# Patient Record
(demographics unavailable — no encounter records)

---

## 2025-07-06 NOTE — PHYSICAL EXAM
[Chaperoned Physical Exam] : A chaperone was present in the examining room during all aspects of the physical examination. [MA] : MA [FreeTextEntry2] : Nikolai [Appropriately responsive] : appropriately responsive [Alert] : alert [No Acute Distress] : no acute distress [No Lymphadenopathy] : no lymphadenopathy [Soft] : soft [Non-tender] : non-tender [Non-distended] : non-distended [No HSM] : No HSM [No Lesions] : no lesions [No Mass] : no mass [Oriented x3] : oriented x3 [Examination Of The Breasts] : a normal appearance [No Masses] : no breast masses were palpable [Labia Majora] : normal [Labia Minora] : normal [Normal] : normal [Uterine Adnexae] : normal

## 2025-07-06 NOTE — PLAN
[FreeTextEntry1] :  Urine pregnancy test ordered due to slightly delayed menstrual cycle- neg in office  - Recommend follow-up in 1-2 years for next routine examination  - Patient education provided on the importance of regular gynecological check-ups  - Prediabetes Risk : Patient has a history of borderline elevated blood glucose. Recent fasting glucose of 102 mg/dL suggests continued risk for prediabetes, despite being interpreted as not pre-diabetic by her general practitioner. - Advise patient to continue monitoring blood glucose levels Recommend lifestyle modifications including diet and exercise  - Suggest follow-up with her general practitioner for further diabetes screening, including HbA1c test  - Patient education on signs and symptoms of diabetes and the importance of regular screening  - Anemia Concern : Patient reports a history of low hemoglobin levels that subsequently increased, requiring further testing. - Review recent hemoglobin test results if available  - Consider ordering a complete blood count (CBC) if not recently done  - Advise follow-up with general practitioner for monitoring of hemoglobin levels  - Patient education on signs and symptoms of anemia and importance of iron-rich diet  - Family Planning : Patient is sexually active and currently using condoms for contraception. - Discuss satisfaction with current contraceptive method Patient reports good mood with a PHQ-9 score of 1, indicating minimal depression. However, she is managing stress related to her child with autism. - Continue to monitor mental health at future visits 
Additional Progress Note...

## 2025-07-06 NOTE — HISTORY OF PRESENT ILLNESS
[FreeTextEntry1] : Yvrose Rodriguez, a mother of three, presents for a routine gynecological examination after a three-year interval. She initially expressed a desire to skip the Pap smear but agreed to proceed with the examination. The patient reports no specific gynecological complaints. She mentions that her menstrual cycles are regular, occurring monthly, although the duration has decreased from 7 days to 3 days. The patient also discusses her recent diabetes screening, reporting a fasting blood glucose of 102 mg/dL, which was interpreted as not pre-diabetic by her general practitioner. She expresses uncertainty about this interpretation and mentions having her hemoglobin levels checked, which were initially low and then high on subsequent testing. - Past Medical History : The patient has a history of borderline elevated blood glucose levels, suggesting a risk for diabetes. She also mentions a history of low hemoglobin levels that subsequently increased, requiring further testing. [Condoms] : uses condoms [Y] : Patient is sexually active [Monogamous (Male Partner)] : is monogamous with a male partner [PGHxTotal] : 3 [[facility] : 3 [facility] : [facility] [Regular Cycle Intervals] : periods have been regular